# Patient Record
Sex: FEMALE | URBAN - METROPOLITAN AREA
[De-identification: names, ages, dates, MRNs, and addresses within clinical notes are randomized per-mention and may not be internally consistent; named-entity substitution may affect disease eponyms.]

---

## 2020-03-05 ENCOUNTER — APPOINTMENT (OUTPATIENT)
Age: 25
Setting detail: DERMATOLOGY
End: 2020-03-06

## 2020-03-05 VITALS — HEIGHT: 64 IN | WEIGHT: 140 LBS

## 2020-03-05 DIAGNOSIS — L30.9 DERMATITIS, UNSPECIFIED: ICD-10-CM

## 2020-03-05 DIAGNOSIS — Q828 OTHER SPECIFIED ANOMALIES OF SKIN: ICD-10-CM

## 2020-03-05 DIAGNOSIS — Q826 OTHER SPECIFIED ANOMALIES OF SKIN: ICD-10-CM

## 2020-03-05 DIAGNOSIS — Q819 OTHER SPECIFIED ANOMALIES OF SKIN: ICD-10-CM

## 2020-03-05 PROBLEM — Q82.8 OTHER SPECIFIED CONGENITAL MALFORMATIONS OF SKIN: Status: ACTIVE | Noted: 2020-03-05

## 2020-03-05 PROCEDURE — OTHER TREATMENT REGIMEN: OTHER

## 2020-03-05 PROCEDURE — OTHER MIPS QUALITY: OTHER

## 2020-03-05 PROCEDURE — OTHER FOLLOW UP FOR NEXT VISIT: OTHER

## 2020-03-05 PROCEDURE — 99202 OFFICE O/P NEW SF 15 MIN: CPT

## 2020-03-05 PROCEDURE — OTHER COUNSELING: OTHER

## 2020-03-05 ASSESSMENT — LOCATION ZONE DERM
LOCATION ZONE: FACE
LOCATION ZONE: ARM

## 2020-03-05 ASSESSMENT — LOCATION DETAILED DESCRIPTION DERM
LOCATION DETAILED: RIGHT PROXIMAL POSTERIOR UPPER ARM
LOCATION DETAILED: RIGHT DISTAL DORSAL FOREARM
LOCATION DETAILED: INFERIOR MID FOREHEAD
LOCATION DETAILED: LEFT CENTRAL BUCCAL CHEEK
LOCATION DETAILED: RIGHT CENTRAL BUCCAL CHEEK
LOCATION DETAILED: LEFT PROXIMAL DORSAL FOREARM
LOCATION DETAILED: LEFT PROXIMAL POSTERIOR UPPER ARM

## 2020-03-05 ASSESSMENT — LOCATION SIMPLE DESCRIPTION DERM
LOCATION SIMPLE: RIGHT CHEEK
LOCATION SIMPLE: LEFT CHEEK
LOCATION SIMPLE: RIGHT POSTERIOR UPPER ARM
LOCATION SIMPLE: LEFT FOREARM
LOCATION SIMPLE: RIGHT FOREARM
LOCATION SIMPLE: LEFT POSTERIOR UPPER ARM
LOCATION SIMPLE: INFERIOR FOREHEAD

## 2020-03-05 ASSESSMENT — PAIN INTENSITY VAS: HOW INTENSE IS YOUR PAIN 0 BEING NO PAIN, 10 BEING THE MOST SEVERE PAIN POSSIBLE?: NO PAIN

## 2020-03-05 ASSESSMENT — BSA RASH: BSA RASH: 4

## 2020-03-05 ASSESSMENT — SEVERITY ASSESSMENT: SEVERITY: MILD

## 2020-03-05 NOTE — HPI: RASH
What Type Of Note Output Would You Prefer (Optional)?: Bullet Format
Is The Patient Presenting As Previously Scheduled?: Yes
How Severe Is Your Rash?: mild
Is This A New Presentation, Or A Follow-Up?: Rash
Additional History: Patient with a somewhat confusing history of some acne and irritation previously on the face. She has been told that this rash represents eczema or perioral dermatitis. She states that it is not very symptomatic she just simply has these patches of dry, scaly, thickened skin on her face for the past one year. She does admit to picking at the areas and messing with them on a regular basis. She has been using topical Eucrisa for the past one month with some slight benefit but again the areas are asymptomatic. By patient report topical steroids in the past have seem to make things worse. She is simply bothered by their overall appearance. Of note she is nine months pregnant

## 2020-03-05 NOTE — PROCEDURE: FOLLOW UP FOR NEXT VISIT
Scheduled For Follow Up In (Optional): 2 months
Scheduled For Follow Up In (Optional): prn
Detail Level: Simple

## 2020-03-05 NOTE — PROCEDURE: TREATMENT REGIMEN
Otc Regimen: SA Lotion BID
Plan: Essentially patient has areas of lichenification which is leading to the discoloration. There is no active evidence of any inflammation. Under dermatoscopic exam the skin has a rugated appearance. Patient will use the over-the-counter Cerave SA lotion for rough and bumpy skin as she is pregnant. However after delivery she can start using the Retin-A micro 1 to 2 nights per week working her way up to every night to see if that doesn’t ultimately have benefit. If not we could consider Duobri or Urea versus the patient following up for a biopsy to help give us a better idea of what’s going on in the skin. My main emphasis to her was to stop picking at skin is this is likely leading to the thickening and discoloration
Continue Regimen: Eucrisa
Samples Given: Retin A Micro 0.06% Gel x2 QHS after birth, Imers info
Detail Level: Zone
Otc Regimen: Cerave lotion for rough and bumpy skin
Plan: Patient is not that bothered by the problem but she can use the over-the-counter lotion. Follow up as needed
Detail Level: Detailed

## 2020-05-16 ENCOUNTER — RX ONLY (RX ONLY)
Age: 25
End: 2020-05-16

## 2020-05-16 RX ORDER — IVERMECTIN 10 MG/G
THIN COAT CREAM TOPICAL QHS
Qty: 1 | Refills: 2 | Status: ERX | COMMUNITY
Start: 2020-05-16

## 2020-05-21 ENCOUNTER — RX ONLY (RX ONLY)
Age: 25
End: 2020-05-21

## 2020-05-21 RX ORDER — AZELAIC ACID 0.15 G/G
THIN COAT GEL TOPICAL QHS
Qty: 1 | Refills: 1 | Status: ERX | COMMUNITY
Start: 2020-05-21

## 2020-11-30 ENCOUNTER — RX ONLY (RX ONLY)
Age: 25
End: 2020-11-30

## 2020-11-30 RX ORDER — TRETINOIN 0.6 MG/G
THIN COAT GEL TOPICAL QHS
Qty: 1 | Refills: 2 | Status: ERX | COMMUNITY
Start: 2020-11-30